# Patient Record
Sex: MALE | Race: WHITE | NOT HISPANIC OR LATINO | URBAN - METROPOLITAN AREA
[De-identification: names, ages, dates, MRNs, and addresses within clinical notes are randomized per-mention and may not be internally consistent; named-entity substitution may affect disease eponyms.]

---

## 2021-02-18 ENCOUNTER — OFFICE VISIT (OUTPATIENT)
Dept: URBAN - METROPOLITAN AREA CLINIC 54 | Facility: CLINIC | Age: 66
End: 2021-02-18

## 2021-02-18 RX ORDER — PRAVASTATIN SODIUM 40 MG/1
(PRIOR AUTH#:000000231572) TABLET ORAL
Qty: 90 DELAYED RELEASE TABLET | Status: ACTIVE | COMMUNITY

## 2021-02-18 RX ORDER — AMLODIPINE BESYLATE 10 MG/1
(PRIOR AUTH#:000000227445) TABLET ORAL
Qty: 90 DELAYED RELEASE TABLET | Status: ACTIVE | COMMUNITY

## 2021-02-18 RX ORDER — HYDROCHLOROTHIAZIDE 12.5 MG/1
(PRIOR AUTH#:000000231586) TABLET ORAL
Qty: 90 DELAYED RELEASE TABLET | Status: ACTIVE | COMMUNITY

## 2021-02-18 RX ORDER — LOSARTAN POTASSIUM 100 MG/1
(PRIOR AUTH#:000000231585) TABLET, FILM COATED ORAL
Qty: 90 DELAYED RELEASE TABLET | Status: ACTIVE | COMMUNITY

## 2021-02-18 RX ORDER — METOPROLOL SUCCINATE 50 MG/1
(PRIOR AUTH#:000000222181) TABLET, EXTENDED RELEASE ORAL
Qty: 135 DELAYED RELEASE TABLET | Status: ACTIVE | COMMUNITY

## 2021-02-18 RX ORDER — METFORMIN ER 500 MG 500 MG/1
(PRIOR AUTH#:000000231583) TABLET ORAL
Qty: 360 DELAYED RELEASE TABLET | Status: ACTIVE | COMMUNITY

## 2021-02-18 RX ORDER — LEVOTHYROXINE SODIUM 175 UG/1
(PRIOR AUTH#:000000231588) TABLET ORAL
Qty: 90 DELAYED RELEASE TABLET | Status: ACTIVE | COMMUNITY

## 2021-02-23 ENCOUNTER — OFFICE VISIT (OUTPATIENT)
Dept: URBAN - METROPOLITAN AREA CLINIC 23 | Facility: CLINIC | Age: 66
End: 2021-02-23
Payer: COMMERCIAL

## 2021-02-23 DIAGNOSIS — K85.10 GALLSTONE PANCREATITIS: ICD-10-CM

## 2021-02-23 DIAGNOSIS — R10.11 RUQ ABDOMINAL PAIN: ICD-10-CM

## 2021-02-23 DIAGNOSIS — Z12.11 COLON CANCER SCREENING: ICD-10-CM

## 2021-02-23 DIAGNOSIS — R74.8 ELEVATED LIVER ENZYMES: ICD-10-CM

## 2021-02-23 PROCEDURE — 1036F TOBACCO NON-USER: CPT | Performed by: INTERNAL MEDICINE

## 2021-02-23 PROCEDURE — 3017F COLORECTAL CA SCREEN DOC REV: CPT | Performed by: INTERNAL MEDICINE

## 2021-02-23 PROCEDURE — G8420 CALC BMI NORM PARAMETERS: HCPCS | Performed by: INTERNAL MEDICINE

## 2021-02-23 PROCEDURE — G8427 DOCREV CUR MEDS BY ELIG CLIN: HCPCS | Performed by: INTERNAL MEDICINE

## 2021-02-23 PROCEDURE — G9903 PT SCRN TBCO ID AS NON USER: HCPCS | Performed by: INTERNAL MEDICINE

## 2021-02-23 PROCEDURE — G9622 NO UNHEAL ETOH USER: HCPCS | Performed by: INTERNAL MEDICINE

## 2021-02-23 PROCEDURE — 99204 OFFICE O/P NEW MOD 45 MIN: CPT | Performed by: INTERNAL MEDICINE

## 2021-02-23 RX ORDER — LEVOTHYROXINE SODIUM 175 UG/1
(PRIOR AUTH#:000000231588) TABLET ORAL
Qty: 90 DELAYED RELEASE TABLET | Status: ON HOLD | COMMUNITY

## 2021-02-23 RX ORDER — METFORMIN ER 500 MG 500 MG/1
(PRIOR AUTH#:000000231583) TABLET ORAL
Qty: 360 DELAYED RELEASE TABLET | Status: ACTIVE | COMMUNITY

## 2021-02-23 RX ORDER — LOSARTAN POTASSIUM 100 MG/1
(PRIOR AUTH#:000000231585) TABLET, FILM COATED ORAL
Qty: 90 DELAYED RELEASE TABLET | Status: ON HOLD | COMMUNITY

## 2021-02-23 RX ORDER — METOPROLOL SUCCINATE 50 MG/1
(PRIOR AUTH#:000000222181) TABLET, EXTENDED RELEASE ORAL
Qty: 135 DELAYED RELEASE TABLET | Status: ON HOLD | COMMUNITY

## 2021-02-23 RX ORDER — PRAVASTATIN SODIUM 40 MG/1
(PRIOR AUTH#:000000231572) TABLET ORAL
Qty: 90 DELAYED RELEASE TABLET | Status: ACTIVE | COMMUNITY

## 2021-02-23 RX ORDER — HYDROCHLOROTHIAZIDE 12.5 MG/1
(PRIOR AUTH#:000000231586) TABLET ORAL
Qty: 90 DELAYED RELEASE TABLET | Status: ON HOLD | COMMUNITY

## 2021-02-23 RX ORDER — AMLODIPINE BESYLATE 10 MG/1
(PRIOR AUTH#:000000227445) TABLET ORAL
Qty: 90 DELAYED RELEASE TABLET | Status: ON HOLD | COMMUNITY

## 2021-02-23 NOTE — PREVIOUS WORKUP REVIEWED
;:ENDOSCOPIES:LABS: -Labs 2/13/2021:BUN 15, creatinine 0.8, AST 39,  H, total bilirubin 0.5, alkaline phosphatase 86, WBC 10.7, hemoglobin 13.7, platelet 232.  Lipase 930 (upper normal 393).IMAGES:-CT abdomen pelvis with contrast 2/13/2021:Pleural plaque in the lung bases.  Small hiatal hernia.  No bowel obstruction.  No urinary tract obstruction.  Atherosclerosis.  Left inguinal hernia.  Normal gallbladder.  Normal biliary tree.  Normal liver.  Normal pancreas.

## 2021-02-23 NOTE — HPI-TODAY'S VISIT:
65-year-old male who presents for follow-up of ER visit 2/13/2021 at [facility] for acute onset right upper quadrant abdominal pain radiating to the back.  The onset was 3 nights before the ER visit.  Denies nausea vomiting or fever at the time.  The pain has completely resolved.  Apparently he was prescribed a PPI but he does not remember the name. On review of the medical record it seems like he had gallstone pancreatitis.  Elevated ALT and lipase 900 ranges.  Denies alcohol use.  CT scan was unremarkable. No family history of gallstone disease. No previous colon cancer screening.

## 2021-02-24 ENCOUNTER — DASHBOARD ENCOUNTERS (OUTPATIENT)
Age: 66
End: 2021-02-24

## 2021-02-24 LAB
A/G RATIO: 1.7
ALBUMIN: 4.3
ALKALINE PHOSPHATASE: 95
ALT (SGPT): 98
AST (SGOT): 34
BILIRUBIN, TOTAL: 0.7
BUN/CREATININE RATIO: 14
BUN: 13
CALCIUM: 9.4
CARBON DIOXIDE, TOTAL: 24
CHLORIDE: 103
CREATININE: 0.92
EGFR IF AFRICN AM: 102
EGFR IF NONAFRICN AM: 88
GLOBULIN, TOTAL: 2.6
GLUCOSE: 135
HEMATOCRIT: 43.9
HEMOGLOBIN: 14.8
LIPASE: 38
MCH: 30.8
MCHC: 33.7
MCV: 92
NRBC: (no result)
PLATELETS: 248
POTASSIUM: 4.3
PROTEIN, TOTAL: 6.9
RBC: 4.8
RDW: 13.7
SODIUM: 141
WBC: 9.8

## 2021-03-01 ENCOUNTER — TELEPHONE ENCOUNTER (OUTPATIENT)
Dept: URBAN - METROPOLITAN AREA CLINIC 23 | Facility: CLINIC | Age: 66
End: 2021-03-01

## 2021-03-18 ENCOUNTER — OFFICE VISIT (OUTPATIENT)
Dept: URBAN - METROPOLITAN AREA CLINIC 54 | Facility: CLINIC | Age: 66
End: 2021-03-18